# Patient Record
Sex: FEMALE | URBAN - METROPOLITAN AREA
[De-identification: names, ages, dates, MRNs, and addresses within clinical notes are randomized per-mention and may not be internally consistent; named-entity substitution may affect disease eponyms.]

---

## 2019-01-28 ENCOUNTER — APPOINTMENT (RX ONLY)
Dept: URBAN - METROPOLITAN AREA CLINIC 88 | Facility: CLINIC | Age: 24
Setting detail: DERMATOLOGY
End: 2019-01-28

## 2019-01-28 DIAGNOSIS — L71.0 PERIORAL DERMATITIS: ICD-10-CM

## 2019-01-28 PROCEDURE — ? TREATMENT REGIMEN

## 2019-01-28 PROCEDURE — 99202 OFFICE O/P NEW SF 15 MIN: CPT

## 2019-01-28 PROCEDURE — ? COUNSELING

## 2019-01-28 ASSESSMENT — LOCATION SIMPLE DESCRIPTION DERM
LOCATION SIMPLE: LEFT CHEEK
LOCATION SIMPLE: CHIN
LOCATION SIMPLE: RIGHT CHEEK

## 2019-01-28 ASSESSMENT — LOCATION DETAILED DESCRIPTION DERM
LOCATION DETAILED: LEFT CHIN
LOCATION DETAILED: LEFT SUPERIOR CENTRAL BUCCAL CHEEK
LOCATION DETAILED: RIGHT SUPERIOR MEDIAL BUCCAL CHEEK

## 2019-01-28 ASSESSMENT — LOCATION ZONE DERM: LOCATION ZONE: FACE

## 2019-01-28 NOTE — PROCEDURE: TREATMENT REGIMEN
Samples Given: Cloderm 0.1%: Twice a day for 1 week then switch to Elidel sample: Twice a day x 1 week
Plan: Has been limiting her facial regimen to basic topicals to minimize flares. Should avoid toner until rash subsides. May stick to gentle cleansers. Will start treating with topical steroids
Detail Level: Zone